# Patient Record
Sex: FEMALE | Race: WHITE | HISPANIC OR LATINO | URBAN - METROPOLITAN AREA
[De-identification: names, ages, dates, MRNs, and addresses within clinical notes are randomized per-mention and may not be internally consistent; named-entity substitution may affect disease eponyms.]

---

## 2017-05-03 ENCOUNTER — APPOINTMENT (RX ONLY)
Dept: URBAN - METROPOLITAN AREA CLINIC 37 | Facility: CLINIC | Age: 72
Setting detail: DERMATOLOGY
End: 2017-05-03

## 2017-05-03 DIAGNOSIS — L72.0 EPIDERMAL CYST: ICD-10-CM

## 2017-05-03 DIAGNOSIS — L82.1 OTHER SEBORRHEIC KERATOSIS: ICD-10-CM

## 2017-05-03 DIAGNOSIS — L81.4 OTHER MELANIN HYPERPIGMENTATION: ICD-10-CM

## 2017-05-03 PROBLEM — F41.9 ANXIETY DISORDER, UNSPECIFIED: Status: ACTIVE | Noted: 2017-05-03

## 2017-05-03 PROCEDURE — 99203 OFFICE O/P NEW LOW 30 MIN: CPT

## 2018-07-06 ENCOUNTER — APPOINTMENT (RX ONLY)
Dept: URBAN - METROPOLITAN AREA CLINIC 37 | Facility: CLINIC | Age: 73
Setting detail: DERMATOLOGY
End: 2018-07-06

## 2018-07-06 DIAGNOSIS — D18.0 HEMANGIOMA: ICD-10-CM

## 2018-07-06 DIAGNOSIS — D22 MELANOCYTIC NEVI: ICD-10-CM

## 2018-07-06 DIAGNOSIS — L81.4 OTHER MELANIN HYPERPIGMENTATION: ICD-10-CM

## 2018-07-06 PROBLEM — K21.9 GASTRO-ESOPHAGEAL REFLUX DISEASE WITHOUT ESOPHAGITIS: Status: ACTIVE | Noted: 2018-07-06

## 2018-07-06 PROBLEM — D22.5 MELANOCYTIC NEVI OF TRUNK: Status: ACTIVE | Noted: 2018-07-06

## 2018-07-06 PROBLEM — D18.01 HEMANGIOMA OF SKIN AND SUBCUTANEOUS TISSUE: Status: ACTIVE | Noted: 2018-07-06

## 2018-07-06 PROBLEM — M12.9 ARTHROPATHY, UNSPECIFIED: Status: ACTIVE | Noted: 2018-07-06

## 2018-07-06 PROBLEM — I10 ESSENTIAL (PRIMARY) HYPERTENSION: Status: ACTIVE | Noted: 2018-07-06

## 2018-07-06 PROCEDURE — 99213 OFFICE O/P EST LOW 20 MIN: CPT

## 2024-08-30 ENCOUNTER — APPOINTMENT (RX ONLY)
Dept: URBAN - METROPOLITAN AREA CLINIC 151 | Facility: CLINIC | Age: 79
Setting detail: DERMATOLOGY
End: 2024-08-30

## 2024-08-30 DIAGNOSIS — D22 MELANOCYTIC NEVI: ICD-10-CM

## 2024-08-30 DIAGNOSIS — L82.0 INFLAMED SEBORRHEIC KERATOSIS: ICD-10-CM

## 2024-08-30 DIAGNOSIS — B35.3 TINEA PEDIS: ICD-10-CM

## 2024-08-30 PROBLEM — D22.72 MELANOCYTIC NEVI OF LEFT LOWER LIMB, INCLUDING HIP: Status: ACTIVE | Noted: 2024-08-30

## 2024-08-30 PROBLEM — D23.9 OTHER BENIGN NEOPLASM OF SKIN, UNSPECIFIED: Status: ACTIVE | Noted: 2024-08-30

## 2024-08-30 PROCEDURE — 17110 DESTRUCTION B9 LES UP TO 14: CPT

## 2024-08-30 PROCEDURE — ? DIAGNOSIS COMMENT

## 2024-08-30 PROCEDURE — ? LIQUID NITROGEN

## 2024-08-30 PROCEDURE — ? PRESCRIPTION MEDICATION MANAGEMENT

## 2024-08-30 PROCEDURE — 99203 OFFICE O/P NEW LOW 30 MIN: CPT | Mod: 25

## 2024-08-30 PROCEDURE — ? COUNSELING

## 2024-08-30 PROCEDURE — ? PRESCRIPTION

## 2024-08-30 PROCEDURE — ? PHOTO-DOCUMENTATION

## 2024-08-30 PROCEDURE — ? OBSERVATION AND MEASURE

## 2024-08-30 RX ORDER — ECONAZOLE NITRATE 10 MG/G
CREAM TOPICAL
Qty: 85 | Refills: 2 | Status: ERX | COMMUNITY
Start: 2024-08-30

## 2024-08-30 RX ADMIN — ECONAZOLE NITRATE: 10 CREAM TOPICAL at 00:00

## 2024-08-30 ASSESSMENT — LOCATION DETAILED DESCRIPTION DERM
LOCATION DETAILED: GLABELLA
LOCATION DETAILED: LEFT MEDIAL PLANTAR MIDFOOT

## 2024-08-30 ASSESSMENT — LOCATION SIMPLE DESCRIPTION DERM
LOCATION SIMPLE: GLABELLA
LOCATION SIMPLE: LEFT PLANTAR SURFACE

## 2024-08-30 ASSESSMENT — LOCATION ZONE DERM
LOCATION ZONE: FACE
LOCATION ZONE: FEET

## 2024-08-30 NOTE — PROCEDURE: LIQUID NITROGEN
Show Topical Anesthesia Variable?: Yes
Consent: The patient's consent was obtained including but not limited to risks of crusting, scabbing, blistering, scarring, darker or lighter pigmentary change, recurrence, incomplete removal and infection.
Spray Paint Text: The liquid nitrogen was applied to the skin utilizing a spray paint frosting technique.
Add 52 Modifier (Optional): no
Detail Level: Detailed
Post-Care Instructions: I reviewed with the patient in detail post-care instructions. Patient is to wear sunprotection, and avoid picking at any of the treated lesions. Pt may apply Vaseline to crusted or scabbing areas.
Medical Necessity Information: It is in your best interest to select a reason for this procedure from the list below. All of these items fulfill various CMS LCD requirements except the new and changing color options.
Medical Necessity Clause: This procedure was medically necessary because the lesions that were treated were:

## 2024-08-30 NOTE — PROCEDURE: OBSERVATION
Detail Level: Detailed
Size Of Lesion: 6mm
Morphology Per Location (Optional): Dark brown linear macule

## 2024-08-30 NOTE — PROCEDURE: PRESCRIPTION MEDICATION MANAGEMENT
Render In Strict Bullet Format?: No
Initiate Treatment: econazole 1 % topical cream \\nSig: Apply twice daily to affected area
Detail Level: Zone

## 2024-08-30 NOTE — PROCEDURE: DIAGNOSIS COMMENT
Detail Level: Zone
Comment: Patient presents with tinea pedis on left foot- Mild. Will initiate econazole 1 % topical cream.
Render Risk Assessment In Note?: no

## 2024-08-30 NOTE — PROCEDURE: PHOTO-DOCUMENTATION
Details (Free Text): Left foot
Detail Level: Zone
Photo Preface (Leave Blank If You Do Not Want): Photographs were obtained today

## 2025-04-02 ENCOUNTER — NEW PATIENT (OUTPATIENT)
Dept: URBAN - METROPOLITAN AREA CLINIC 85 | Facility: CLINIC | Age: 80
End: 2025-04-02

## 2025-04-02 DIAGNOSIS — H35.373: ICD-10-CM

## 2025-04-02 DIAGNOSIS — H43.813: ICD-10-CM

## 2025-04-02 DIAGNOSIS — H34.8310: ICD-10-CM

## 2025-04-02 PROCEDURE — 92134 CPTRZ OPH DX IMG PST SGM RTA: CPT

## 2025-04-02 PROCEDURE — 92202 OPSCPY EXTND ON/MAC DRAW: CPT

## 2025-04-02 PROCEDURE — 99204 OFFICE O/P NEW MOD 45 MIN: CPT

## 2025-04-02 ASSESSMENT — TONOMETRY
OD_IOP_MMHG: 18
OS_IOP_MMHG: 20

## 2025-04-02 ASSESSMENT — VISUAL ACUITY
OD_CC: 20/60-1
OS_CC: 20/25

## 2025-04-09 ENCOUNTER — INJECTION ONLY (OUTPATIENT)
Dept: URBAN - METROPOLITAN AREA CLINIC 85 | Facility: CLINIC | Age: 80
End: 2025-04-09

## 2025-04-09 DIAGNOSIS — H34.8310: ICD-10-CM

## 2025-04-09 PROCEDURE — 67028 INJECTION EYE DRUG: CPT

## 2025-04-09 ASSESSMENT — VISUAL ACUITY
OD_CC: 20/70
OS_CC: 20/20-1

## 2025-04-09 ASSESSMENT — TONOMETRY
OD_IOP_MMHG: 20
OS_IOP_MMHG: 22

## 2025-04-18 ENCOUNTER — APPOINTMENT (OUTPATIENT)
Dept: URBAN - METROPOLITAN AREA CLINIC 151 | Facility: CLINIC | Age: 80
Setting detail: DERMATOLOGY
End: 2025-04-18

## 2025-04-18 DIAGNOSIS — L82.0 INFLAMED SEBORRHEIC KERATOSIS: ICD-10-CM

## 2025-04-18 DIAGNOSIS — L82.1 OTHER SEBORRHEIC KERATOSIS: ICD-10-CM

## 2025-04-18 DIAGNOSIS — D22 MELANOCYTIC NEVI: ICD-10-CM

## 2025-04-18 DIAGNOSIS — L85.3 XEROSIS CUTIS: ICD-10-CM

## 2025-04-18 DIAGNOSIS — L24 IRRITANT CONTACT DERMATITIS: ICD-10-CM

## 2025-04-18 PROBLEM — D22.9 MELANOCYTIC NEVI, UNSPECIFIED: Status: ACTIVE | Noted: 2025-04-18

## 2025-04-18 PROBLEM — D23.9 OTHER BENIGN NEOPLASM OF SKIN, UNSPECIFIED: Status: ACTIVE | Noted: 2025-04-18

## 2025-04-18 PROBLEM — L24.9 IRRITANT CONTACT DERMATITIS, UNSPECIFIED CAUSE: Status: ACTIVE | Noted: 2025-04-18

## 2025-04-18 PROCEDURE — 17110 DESTRUCTION B9 LES UP TO 14: CPT

## 2025-04-18 PROCEDURE — ? LIQUID NITROGEN

## 2025-04-18 PROCEDURE — ? COUNSELING

## 2025-04-18 PROCEDURE — ? DIAGNOSIS COMMENT

## 2025-04-18 PROCEDURE — 99213 OFFICE O/P EST LOW 20 MIN: CPT | Mod: 25

## 2025-04-18 ASSESSMENT — LOCATION DETAILED DESCRIPTION DERM
LOCATION DETAILED: RIGHT MEDIAL KNEE
LOCATION DETAILED: LEFT DISTAL DORSAL FOREARM
LOCATION DETAILED: LEFT POPLITEAL SKIN
LOCATION DETAILED: LEFT MID-UPPER BACK
LOCATION DETAILED: RIGHT ANTERIOR MEDIAL DISTAL THIGH
LOCATION DETAILED: MID-FRONTAL SCALP

## 2025-04-18 ASSESSMENT — LOCATION SIMPLE DESCRIPTION DERM
LOCATION SIMPLE: LEFT POPLITEAL SKIN
LOCATION SIMPLE: ANTERIOR SCALP
LOCATION SIMPLE: RIGHT KNEE
LOCATION SIMPLE: LEFT UPPER BACK
LOCATION SIMPLE: RIGHT THIGH
LOCATION SIMPLE: LEFT FOREARM

## 2025-04-18 ASSESSMENT — LOCATION ZONE DERM
LOCATION ZONE: SCALP
LOCATION ZONE: LEG
LOCATION ZONE: ARM
LOCATION ZONE: TRUNK

## 2025-04-18 NOTE — PROCEDURE: DIAGNOSIS COMMENT
Detail Level: Zone
Render Risk Assessment In Note?: no
Comment: Pt reports sometimes getting a rash related to sweating and urinary incontinence. Recommended using Dove Dry spray on lower abdomen to prevent sweating and further irritation and rashes. Ok to continue Desitin as needed as a skin protectant.
Comment: Recommended pt put moisturizer on back scratcher to reach dry areas on back.

## 2025-04-18 NOTE — PROCEDURE: MIPS QUALITY
Quality 226: Preventive Care And Screening: Tobacco Use: Screening And Cessation Intervention: Patient screened for tobacco use and is an ex/non-smoker
Quality 47: Advance Care Plan: Advance Care Planning discussed and documented; advance care plan or surrogate decision maker documented in the medical record.
Detail Level: Detailed
Quality 431: Preventive Care And Screening: Unhealthy Alcohol Use - Screening: Patient not identified as an unhealthy alcohol user when screened for unhealthy alcohol use using a systematic screening method
Quality 130: Documentation Of Current Medications In The Medical Record: Current Medications Documented
complains of pain/discomfort/RIBS, SUPRAPUBIC

## 2025-05-07 ENCOUNTER — FOLLOW UP (OUTPATIENT)
Dept: URBAN - METROPOLITAN AREA CLINIC 85 | Facility: CLINIC | Age: 80
End: 2025-05-07

## 2025-05-07 DIAGNOSIS — H43.813: ICD-10-CM

## 2025-05-07 DIAGNOSIS — H34.8310: ICD-10-CM

## 2025-05-07 DIAGNOSIS — H35.373: ICD-10-CM

## 2025-05-07 DIAGNOSIS — H40.013: ICD-10-CM

## 2025-05-07 PROCEDURE — 92202 OPSCPY EXTND ON/MAC DRAW: CPT | Mod: 59

## 2025-05-07 PROCEDURE — 92012 INTRM OPH EXAM EST PATIENT: CPT | Mod: 25

## 2025-05-07 PROCEDURE — 92134 CPTRZ OPH DX IMG PST SGM RTA: CPT

## 2025-05-07 PROCEDURE — 67028 INJECTION EYE DRUG: CPT

## 2025-05-07 ASSESSMENT — VISUAL ACUITY
OS_CC: 20/20
OD_CC: 20/30

## 2025-05-07 ASSESSMENT — TONOMETRY
OD_IOP_MMHG: 15
OS_IOP_MMHG: 16

## 2025-06-18 ENCOUNTER — FOLLOW UP (OUTPATIENT)
Dept: URBAN - METROPOLITAN AREA CLINIC 85 | Facility: CLINIC | Age: 80
End: 2025-06-18

## 2025-06-18 DIAGNOSIS — H35.373: ICD-10-CM

## 2025-06-18 DIAGNOSIS — H43.813: ICD-10-CM

## 2025-06-18 DIAGNOSIS — H40.013: ICD-10-CM

## 2025-06-18 DIAGNOSIS — H34.8310: ICD-10-CM

## 2025-06-18 PROCEDURE — 92014 COMPRE OPH EXAM EST PT 1/>: CPT | Mod: 25

## 2025-06-18 PROCEDURE — 67028 INJECTION EYE DRUG: CPT

## 2025-06-18 PROCEDURE — 92202 OPSCPY EXTND ON/MAC DRAW: CPT | Mod: 59

## 2025-06-18 PROCEDURE — 92134 CPTRZ OPH DX IMG PST SGM RTA: CPT

## 2025-06-18 ASSESSMENT — TONOMETRY
OS_IOP_MMHG: 12
OD_IOP_MMHG: 11

## 2025-06-18 ASSESSMENT — VISUAL ACUITY
OD_CC: 20/30
OS_CC: 20/20-2

## 2025-08-29 ENCOUNTER — APPOINTMENT (OUTPATIENT)
Dept: URBAN - METROPOLITAN AREA CLINIC 151 | Facility: CLINIC | Age: 80
Setting detail: DERMATOLOGY
End: 2025-08-29

## 2025-08-29 DIAGNOSIS — D18.0 HEMANGIOMA: ICD-10-CM

## 2025-08-29 DIAGNOSIS — L57.8 OTHER SKIN CHANGES DUE TO CHRONIC EXPOSURE TO NONIONIZING RADIATION: ICD-10-CM

## 2025-08-29 DIAGNOSIS — L82.1 OTHER SEBORRHEIC KERATOSIS: ICD-10-CM

## 2025-08-29 DIAGNOSIS — D22 MELANOCYTIC NEVI: ICD-10-CM

## 2025-08-29 DIAGNOSIS — L82.0 INFLAMED SEBORRHEIC KERATOSIS: ICD-10-CM

## 2025-08-29 DIAGNOSIS — L81.4 OTHER MELANIN HYPERPIGMENTATION: ICD-10-CM

## 2025-08-29 PROBLEM — D18.01 HEMANGIOMA OF SKIN AND SUBCUTANEOUS TISSUE: Status: ACTIVE | Noted: 2025-08-29

## 2025-08-29 PROBLEM — D22.5 MELANOCYTIC NEVI OF TRUNK: Status: ACTIVE | Noted: 2025-08-29

## 2025-08-29 PROCEDURE — ? LIQUID NITROGEN

## 2025-08-29 PROCEDURE — ? SUNSCREEN RECOMMENDATIONS

## 2025-08-29 PROCEDURE — ? COUNSELING

## 2025-08-29 ASSESSMENT — LOCATION ZONE DERM
LOCATION ZONE: LEG
LOCATION ZONE: FACE
LOCATION ZONE: SCALP
LOCATION ZONE: TRUNK

## 2025-08-29 ASSESSMENT — LOCATION DETAILED DESCRIPTION DERM
LOCATION DETAILED: LEFT MEDIAL BREAST 6-7:00 REGION
LOCATION DETAILED: LEFT MID-UPPER BACK
LOCATION DETAILED: LEFT CENTRAL PARIETAL SCALP
LOCATION DETAILED: LEFT INFERIOR UPPER BACK
LOCATION DETAILED: RIGHT ANTERIOR DISTAL THIGH
LOCATION DETAILED: RIGHT LATERAL FOREHEAD
LOCATION DETAILED: RIGHT SUPERIOR LATERAL MIDBACK
LOCATION DETAILED: RIGHT INFERIOR UPPER BACK
LOCATION DETAILED: LEFT INFRAMAMMARY CREASE (OUTER QUADRANT)

## 2025-08-29 ASSESSMENT — LOCATION SIMPLE DESCRIPTION DERM
LOCATION SIMPLE: RIGHT FOREHEAD
LOCATION SIMPLE: RIGHT THIGH
LOCATION SIMPLE: RIGHT UPPER BACK
LOCATION SIMPLE: RIGHT LOWER BACK
LOCATION SIMPLE: LEFT BREAST
LOCATION SIMPLE: SCALP
LOCATION SIMPLE: LEFT UPPER BACK